# Patient Record
(demographics unavailable — no encounter records)

---

## 2025-02-15 NOTE — PHYSICAL EXAM
[FreeTextEntry1] : Constitutional: No signs of distress or signs of toxicity. Mental Status: Alert and well oriented. Speech fluent. No aphasia. Fund of knowledge intact. Psychiatric: Mood stable. No cranial nerve abnormality seen. Gait: non antalgic or ataxic. Eyes: no redness or swelling HEENT: intact; no signs of trauma. Neck: No masses noted Pulmonary: no respiratory distress Skin: No rash. Musculoskeletal: Cervical range of motion full in all directions.

## 2025-02-15 NOTE — ASSESSMENT
[FreeTextEntry1] : This is a case of a 28-year-old woman with episodic migraines doing well on Ubrelvy.  No adverse effects.  Examination nonfocal.  Will continue Ubrelvy as directed.  Patient aware of potential risks and benefits of this medication.

## 2025-02-15 NOTE — HISTORY OF PRESENT ILLNESS
[FreeTextEntry1] : This is a case of a 28-year-old woman with history of episodic migraines.  She reports that Ubrelvy does work better than Nurtec in aborting her migraine headaches.  She denies any adverse effects.  Denies any new medical problems.  Quality life good. General

## 2025-06-09 NOTE — PLAN
[FreeTextEntry1] : VISION SCREENING SAFETY / HEALTHY HABITS REVIEWED HEALTHY DIET AND LIFESTYLE MODIFICATIONS VACCINATIONS DISCUSSED: COVID CHECK ROUTINE LAB WORK FOLLOW-UP ALL SPECIALISTS AS DIRECTED HAS UPCOMING GYN APPOINTMENT CALL WITH ANY QUESTIONS, CONCERNS OR CHANGES

## 2025-06-09 NOTE — HISTORY OF PRESENT ILLNESS
[FreeTextEntry1] : PHYSICAL [de-identified] : MS. DIAZ IS A PLEASANT 28 YO PRESENTING FOR YEARLY PHYSICAL.  IS FEELING WELL TODAY.  HAS AN UPCOMING APPOINTMENT WITH GYN FOR YEARLY EXAM.  LMP: 5/31/25 NEUROLOGY: DR. COLVIN

## 2025-06-09 NOTE — HEALTH RISK ASSESSMENT
[Very Good] : ~his/her~  mood as very good [Yes] : Yes [1 or 2 (0 pts)] : 1 or 2 (0 points) [Monthly or less (1 pt)] : Monthly or less (1 point) [Never (0 pts)] : Never (0 points) [No] : In the past 12 months have you used drugs other than those required for medical reasons? No [Little interest or pleasure doing things] : 1) Little interest or pleasure doing things [Feeling down, depressed, or hopeless] : 2) Feeling down, depressed, or hopeless [0] : 2) Feeling down, depressed, or hopeless: Not at all (0) [PHQ-2 Negative - No further assessment needed] : PHQ-2 Negative - No further assessment needed [Never] : Never [HIV Test offered] : HIV Test offered [Hepatitis C test offered] : Hepatitis C test offered [None] : None [With Significant Other] : lives with significant other [# of Members in Household ___] :  household currently consist of [unfilled] member(s) [Employed] : employed [Graduate School] : graduate school [Significant Other] : lives with significant other [Sexually Active] : sexually active [Feels Safe at Home] : Feels safe at home [Reports normal functional visual acuity (ie: able to read med bottle)] : Reports normal functional visual acuity [Smoke Detector] : smoke detector [Carbon Monoxide Detector] : carbon monoxide detector [Safety elements used in home] : safety elements used in home [Seat Belt] :  uses seat belt [Sunscreen] : uses sunscreen [With Patient/Caregiver] : , with patient/caregiver [NO] : No [Audit-CScore] : 1 [de-identified] : runs, hikes, pilates three days a week [de-identified] : overall eating healthy.  doing better. [[id number]] : 0 [Change in mental status noted] : No change in mental status noted [Language] : denies difficulty with language [Learning/Retaining New Information] : denies difficulty learning/retaining new information [Handling Complex Tasks] : denies difficulty handling complex tasks [Spatial Ability and Orientation] : denies difficulty with spatial ability and orientation [High Risk Behavior] : no high risk behavior [Reports changes in hearing] : Reports no changes in hearing [Reports changes in vision] : Reports no changes in vision [Reports changes in dental health] : Reports no changes in dental health [Travel to Developing Areas] : does not  travel to developing areas [TB Exposure] : is not being exposed to tuberculosis [Caregiver Concerns] : does not have caregiver concerns [PapSmearDate] : 05/23 [de-identified] : glasses for distance and has reading glasses [PapSmearComments] : dr. millard [AdvancecareDate] : 06/25